# Patient Record
Sex: MALE | HISPANIC OR LATINO | URBAN - METROPOLITAN AREA
[De-identification: names, ages, dates, MRNs, and addresses within clinical notes are randomized per-mention and may not be internally consistent; named-entity substitution may affect disease eponyms.]

---
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
## 2023-01-03 ENCOUNTER — OFFICE VISIT (OUTPATIENT)
Dept: OPHTHALMOLOGY | Facility: CLINIC | Age: 18
End: 2023-01-03
Attending: OPHTHALMOLOGY
Payer: COMMERCIAL

## 2023-01-03 DIAGNOSIS — H16.253 PHLYCTENULAR KERATOCONJUNCTIVITIS OF BOTH EYES: ICD-10-CM

## 2023-01-03 DIAGNOSIS — H16.253 PHLYCTENULAR KERATOCONJUNCTIVITIS OF BOTH EYES: Primary | ICD-10-CM

## 2023-01-03 DIAGNOSIS — H16.403 CORNEAL NEOVASCULARIZATION OF BOTH EYES: ICD-10-CM

## 2023-01-03 DIAGNOSIS — H16.9 KERATITIS, BILATERAL: ICD-10-CM

## 2023-01-03 PROCEDURE — 99214 OFFICE O/P EST MOD 30 MIN: CPT | Mod: GC | Performed by: OPHTHALMOLOGY

## 2023-01-03 PROCEDURE — G0463 HOSPITAL OUTPT CLINIC VISIT: HCPCS

## 2023-01-03 PROCEDURE — 92285 EXTERNAL OCULAR PHOTOGRAPHY: CPT | Performed by: OPHTHALMOLOGY

## 2023-01-03 ASSESSMENT — VISUAL ACUITY
OD_SC+: +2
OD_SC: 20/25
CORRECTION_TYPE: GLASSES
OS_SC: 20/20
METHOD: SNELLEN - LINEAR

## 2023-01-03 ASSESSMENT — CONF VISUAL FIELD
OD_SUPERIOR_TEMPORAL_RESTRICTION: 0
OS_NORMAL: 1
OD_INFERIOR_NASAL_RESTRICTION: 0
METHOD: COUNTING FINGERS
OS_INFERIOR_TEMPORAL_RESTRICTION: 0
OS_SUPERIOR_NASAL_RESTRICTION: 0
OD_INFERIOR_TEMPORAL_RESTRICTION: 0
OS_SUPERIOR_TEMPORAL_RESTRICTION: 0
OD_SUPERIOR_NASAL_RESTRICTION: 0
OS_INFERIOR_NASAL_RESTRICTION: 0
OD_NORMAL: 1

## 2023-01-03 ASSESSMENT — TONOMETRY
OD_IOP_MMHG: 17`
OS_IOP_MMHG: 16
IOP_METHOD: ICARE

## 2023-01-03 ASSESSMENT — REFRACTION_WEARINGRX
OS_SPHERE: -4.00
OS_CYLINDER: +3.25
OS_AXIS: 094
OD_SPHERE: -4.50
OD_CYLINDER: +2.50
SPECS_TYPE: SVL
OD_AXIS: 097

## 2023-01-03 ASSESSMENT — EXTERNAL EXAM - RIGHT EYE: OD_EXAM: NORMAL

## 2023-01-03 ASSESSMENT — EXTERNAL EXAM - LEFT EYE: OS_EXAM: NORMAL

## 2023-01-03 ASSESSMENT — SLIT LAMP EXAM - LIDS
COMMENTS: NORMAL
COMMENTS: NORMAL

## 2023-01-03 NOTE — NURSING NOTE
Chief Complaints and History of Present Illnesses   Patient presents with     Follow Up     Phlyctenular keratoconjunctivitis of both eyes      Chief Complaint(s) and History of Present Illness(es)     Follow Up            Comments: Phlyctenular keratoconjunctivitis of both eyes           Comments    Pt states no change in VA since last visit  Pt states no itching, tearing ,dryness, ir eye pain    Mary Manzano COT 8:02 AM January 3, 2023

## 2023-01-18 ENCOUNTER — TELEPHONE (OUTPATIENT)
Dept: OPHTHALMOLOGY | Facility: CLINIC | Age: 18
End: 2023-01-18
Payer: COMMERCIAL

## 2023-01-19 ENCOUNTER — TELEPHONE (OUTPATIENT)
Dept: OPHTHALMOLOGY | Facility: CLINIC | Age: 18
End: 2023-01-19
Payer: COMMERCIAL

## 2023-01-19 NOTE — TELEPHONE ENCOUNTER
Health Call Center    Phone Message    May a detailed message be left on voicemail: yes     Reason for Call: Other: Patient calling in for r/s four week follow up appointment. Dr. Holder wanted him seen around 1/31/23 but next available was 3/21/23. Please call patient if appointment needs to be r/s to a sooner date, thank you.     Action Taken: Message routed to:  [facility] Eye    Travel Screening: Not Applicable

## 2023-01-20 NOTE — TELEPHONE ENCOUNTER
Riccardo Contreras x 2 M    Rescheduled his appointment with Dr. Holder for 1/31 @ 2 pm - ok per Serenity Delgado Communication Facilitator on 1/20/2023 at 9:55 AM

## 2023-01-31 ENCOUNTER — OFFICE VISIT (OUTPATIENT)
Dept: OPHTHALMOLOGY | Facility: CLINIC | Age: 18
End: 2023-01-31
Attending: STUDENT IN AN ORGANIZED HEALTH CARE EDUCATION/TRAINING PROGRAM
Payer: COMMERCIAL

## 2023-01-31 DIAGNOSIS — H16.9 KERATITIS, BILATERAL: ICD-10-CM

## 2023-01-31 DIAGNOSIS — H40.043 STEROID RESPONDER, BILATERAL: ICD-10-CM

## 2023-01-31 DIAGNOSIS — H16.403 CORNEAL NEOVASCULARIZATION OF BOTH EYES: ICD-10-CM

## 2023-01-31 DIAGNOSIS — H16.253 PHLYCTENULAR KERATOCONJUNCTIVITIS OF BOTH EYES: Primary | ICD-10-CM

## 2023-01-31 PROCEDURE — G0463 HOSPITAL OUTPT CLINIC VISIT: HCPCS

## 2023-01-31 PROCEDURE — 99214 OFFICE O/P EST MOD 30 MIN: CPT | Mod: GC | Performed by: OPHTHALMOLOGY

## 2023-01-31 RX ORDER — CYCLOSPORINE 0.5 MG/ML
1 EMULSION OPHTHALMIC 2 TIMES DAILY
COMMUNITY
End: 2023-01-31

## 2023-01-31 RX ORDER — CYCLOSPORINE 0.5 MG/ML
1 EMULSION OPHTHALMIC 2 TIMES DAILY
Qty: 120 EACH | Refills: 4 | Status: SHIPPED | OUTPATIENT
Start: 2023-01-31 | End: 2024-01-04

## 2023-01-31 ASSESSMENT — REFRACTION_WEARINGRX
OD_SPHERE: -4.50
OS_AXIS: 094
OS_SPHERE: -4.00
SPECS_TYPE: SVL
OD_AXIS: 097
OD_CYLINDER: +2.50
OS_CYLINDER: +3.25

## 2023-01-31 ASSESSMENT — TONOMETRY
OS_IOP_MMHG: 21
IOP_METHOD: ICARE
OD_IOP_MMHG: 14

## 2023-01-31 ASSESSMENT — VISUAL ACUITY
METHOD: SNELLEN - LINEAR
OS_CC: 20/20
OD_CC: 20/25
CORRECTION_TYPE: GLASSES
OD_CC+: +2

## 2023-01-31 ASSESSMENT — CONF VISUAL FIELD
OD_INFERIOR_TEMPORAL_RESTRICTION: 0
OD_SUPERIOR_TEMPORAL_RESTRICTION: 0
OS_INFERIOR_TEMPORAL_RESTRICTION: 0
OD_SUPERIOR_NASAL_RESTRICTION: 0
OD_NORMAL: 1
OS_NORMAL: 1
OD_INFERIOR_NASAL_RESTRICTION: 0
OS_SUPERIOR_NASAL_RESTRICTION: 0
METHOD: COUNTING FINGERS
OS_INFERIOR_NASAL_RESTRICTION: 0
OS_SUPERIOR_TEMPORAL_RESTRICTION: 0

## 2023-01-31 ASSESSMENT — EXTERNAL EXAM - LEFT EYE: OS_EXAM: NORMAL

## 2023-01-31 ASSESSMENT — SLIT LAMP EXAM - LIDS
COMMENTS: NORMAL
COMMENTS: NORMAL

## 2023-01-31 ASSESSMENT — EXTERNAL EXAM - RIGHT EYE: OD_EXAM: NORMAL

## 2023-01-31 NOTE — PROGRESS NOTES
CC: Follow up for phlycentular DS each eye.    Initial HPI:  Ben Delgado is a 17 year old male with inflammation in both eyes that started in August 2021. He was seen multiple times at [facility] [place]. Condition improved with steroid  (and Doxy) TX. Much improved sx and VA with TX. Last see at [facility] May 2022.    Interval History 01/31/23: Feels very comfortable without eye pain. No flashes, floaters or curtains. Very happy with vision in both eyes.    LCV 9/2022. Symptoms at last visit had improved with prednisolone daily and there was the consideration to decrease to every other day. Patient was also on restasis - there was the recommendation that he could increase restasis as he decreases prednisolone. He was also on cosopt for IOP elevation with prednisolone and doxycycline PO. Today he states he is using prednisolone every other day in both eyes, cosopt BID OU, restasis BID OU, AT daily OU - states his doxycycline was discontinued.     Meds:   Pred every other day each eye  Cyclosporine each eye BID  AT each eye BID (using occasionally)  Lid Hygein each eye bid (baby shampoo)    POH: each eye chalazia in past.    Fam HX: cataracts.    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  - 9/2022: Pt on appropriate TX with much improved VA and Sx by HX.  Va good today. IOP at upper end of Normal. (pt on pred q daily). Continued prednisolone daily, cosopt BID, restasis BID  - 01/03/23 Much improved phlyctenular disease.   - 1/31/23: stable appearing areas of neovascularization, does not appear actively inflamed  - Finishprednisolone every other day   - Continue lid hygiene   - Continue artificial tears PRN OU  - restasis each eye BID  - Discussed chronic nature of this disease that can last a decade ormore.   - Discussed cornea scar and NV.    RTC: 6 - 12 months VT, sooner PRN    Phil Mclain MD  Fellow, Cornea, External Disease and Refractive Surgery  Department of  Ophthalmology  [facility]      Attending Physician Attestation:  Complete documentation of historical and exam elements from today's encounter can be found in the full encounter summary report (not reduplicated in this progress note).  I personally obtained the chief complaint(s) and history of present illness.  I confirmed and edited as necessary the review of systems, past medical/surgical history, family history, social history, and examination findings as documented by others; and I examined the patient myself.  I personally reviewed the relevant tests, images, and reports as documented above.  I formulated and edited as necessary the assessment and plan and discussed the findings and management plan with the patient and family. - Toan Holder MD

## 2023-01-31 NOTE — NURSING NOTE
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          Comments    Pt states no changes to VA since last visit. No pain, redness or tearing . No Flashes or Floaters.    Ocular Meds:  Restasis BID in each eye  Prednisolone every other day in each eye   ATs PRN    Garry Ho 2:04 PM January 31, 2023     Reviewed chart Evelin Pires COT 2:21 PM January 31, 2023

## 2023-02-07 ENCOUNTER — TELEPHONE (OUTPATIENT)
Dept: OPHTHALMOLOGY | Facility: CLINIC | Age: 18
End: 2023-02-07
Payer: COMMERCIAL

## 2023-02-07 NOTE — TELEPHONE ENCOUNTER
Patient communicator left message today reviewing Dr. Holder recommended f/u in 6-12 months             [facility] Call Center    Phone Message    May a detailed message be left on voicemail: yes     Reason for Call: Other: Pt calling in he would like to know when Dr Holder would like him to have a routine eye exam, please call back to discuss further      Action Taken: Message routed to:  Other: eye     Travel Screening: Not Applicable

## 2023-06-16 PROBLEM — H52.13 MYOPIA OF BOTH EYES WITH ASTIGMATISM: Status: ACTIVE | Noted: 2019-04-15

## 2023-06-16 PROBLEM — R51.9 FREQUENT HEADACHES: Status: ACTIVE | Noted: 2020-10-30

## 2023-06-16 PROBLEM — H52.203 MYOPIA OF BOTH EYES WITH ASTIGMATISM: Status: ACTIVE | Noted: 2019-04-15

## 2023-06-16 PROBLEM — L70.0 ACNE VULGARIS: Status: ACTIVE | Noted: 2018-03-27

## 2023-06-16 PROBLEM — H00.14 CHALAZION OF LEFT UPPER EYELID: Status: ACTIVE | Noted: 2019-04-15

## 2023-06-16 NOTE — PROGRESS NOTES
HPI:  Patient presents for an annual eye exam. Doing well.       Pertinent Medical History:    Headaches    Ocular History:    YIN Bearden 04/15/2019.    Myopia, both eyes.     Steroid responder, both eyes.     Eye Medications:    Discontinued pred forte both eyes.     Discontinued cosopt both eyes.     Restasis 2 times daily both eyes.     Assessment and Plan:    #   Myopia, both eyes.     Glasses prescription given.     #   History of Phlyctenular Keratitis (secondary to staph). Inactive.     Restasis 2 times daily both eyes.     Preservative free artificial tears 4 times daily both eyes. As needed.     Last seen by Dr. Holder on 01/31/2023.     #   Central Corneal Scar, right eye.     Due to phlyctenular keratitis in the past.     Vision is 20/20 in both eyes today 06/27/2023.    Central corneal scar is likely mildly visually significant despite 20/20 vision.     #   History of Steroid Responder, both eyes.     Tmax: 34/35    IOP today: 18/18    No longer on steroids. No longer on cosopt.       Patient consented to a dilated eye exam:    Yes. Side effects discussed.    Medical History:  No past medical history on file.    Medications:  Current Outpatient Medications   Medication Sig Dispense Refill     Artificial Tear Solution (JUST TEARS EYE DROPS) SOLN 1 drop       cycloSPORINE (RESTASIS) 0.05 % ophthalmic emulsion Place 1 drop into both eyes 2 times daily 120 each 4     dorzolamide-timolol (COSOPT) 2-0.5 % ophthalmic solution Place 1 drop into both eyes 2 times daily 10 mL 1     prednisoLONE acetate (PRED MILD) 0.12 % ophthalmic suspension 1-2 drops every 48 hours     Complete documentation of historical and exam elements from today's encounter can be found in the full encounter summary report (not reduplicated in this progress note). I personally obtained the chief complaint(s) and history of present illness.  I confirmed and edited as necessary the review of systems, past medical/surgical history, family  history, social history, and examination findings as documented by others; and I examined the patient myself. I personally reviewed the relevant tests, images, and reports as documented above. I formulated and edited as necessary the assessment and plan and discussed the findings and management plan with the patient and family. - Aldair Luis OD

## 2023-06-27 ENCOUNTER — OFFICE VISIT (OUTPATIENT)
Dept: OPHTHALMOLOGY | Facility: CLINIC | Age: 18
End: 2023-06-27
Attending: OPHTHALMOLOGY
Payer: COMMERCIAL

## 2023-06-27 DIAGNOSIS — H17.9 RIGHT CORNEAL SCAR: ICD-10-CM

## 2023-06-27 DIAGNOSIS — H52.13 MYOPIA OF BOTH EYES: Primary | ICD-10-CM

## 2023-06-27 PROCEDURE — G0463 HOSPITAL OUTPT CLINIC VISIT: HCPCS | Performed by: OPTOMETRIST

## 2023-06-27 PROCEDURE — 92015 DETERMINE REFRACTIVE STATE: CPT

## 2023-06-27 PROCEDURE — 92004 COMPRE OPH EXAM NEW PT 1/>: CPT | Performed by: OPTOMETRIST

## 2023-06-27 ASSESSMENT — EXTERNAL EXAM - RIGHT EYE: OD_EXAM: NORMAL

## 2023-06-27 ASSESSMENT — EXTERNAL EXAM - LEFT EYE: OS_EXAM: NORMAL

## 2023-06-27 ASSESSMENT — REFRACTION_MANIFEST
OS_SPHERE: -4.50
OD_CYLINDER: +2.25
OD_AXIS: 090
OS_CYLINDER: +3.00
OD_SPHERE: -4.25
OS_AXIS: 095

## 2023-06-27 ASSESSMENT — VISUAL ACUITY
OS_CC: 20/20
OS_CC+: -1
OD_CC: 20/20
METHOD: SNELLEN - LINEAR
CORRECTION_TYPE: GLASSES

## 2023-06-27 ASSESSMENT — CONF VISUAL FIELD
OS_SUPERIOR_NASAL_RESTRICTION: 0
OS_INFERIOR_TEMPORAL_RESTRICTION: 0
OD_SUPERIOR_NASAL_RESTRICTION: 0
OD_INFERIOR_NASAL_RESTRICTION: 0
OS_NORMAL: 1
OD_NORMAL: 1
METHOD: COUNTING FINGERS
OS_SUPERIOR_TEMPORAL_RESTRICTION: 0
OS_INFERIOR_NASAL_RESTRICTION: 0
OD_SUPERIOR_TEMPORAL_RESTRICTION: 0
OD_INFERIOR_TEMPORAL_RESTRICTION: 0

## 2023-06-27 ASSESSMENT — REFRACTION_WEARINGRX
OS_SPHERE: -4.00
OD_SPHERE: -4.50
OD_AXIS: 097
OS_AXIS: 094
OS_CYLINDER: +3.25
OD_CYLINDER: +2.50
SPECS_TYPE: SVL

## 2023-06-27 ASSESSMENT — TONOMETRY
IOP_METHOD: TONOPEN
OS_IOP_MMHG: 18
OD_IOP_MMHG: 18

## 2023-06-27 ASSESSMENT — SLIT LAMP EXAM - LIDS
COMMENTS: NORMAL
COMMENTS: NORMAL

## 2023-06-27 NOTE — NURSING NOTE
Chief Complaints and History of Present Illnesses   Patient presents with     COMPREHENSIVE EYE EXAM     Chief Complaint(s) and History of Present Illness(es)     COMPREHENSIVE EYE EXAM           Comments    Patient states that his vision is well in both eyes. No dryness. No pain and irritation. No flashes of light. No floaters.     Ocular Meds:restasis BID each eye   AT daily OU     Parag Morajohan VÁSQUEZ, June 27, 2023 1:25 PM

## 2024-01-04 ENCOUNTER — OFFICE VISIT (OUTPATIENT)
Dept: OPHTHALMOLOGY | Facility: CLINIC | Age: 19
End: 2024-01-04
Attending: OPHTHALMOLOGY
Payer: COMMERCIAL

## 2024-01-04 DIAGNOSIS — H40.043 STEROID RESPONDER, BILATERAL: ICD-10-CM

## 2024-01-04 DIAGNOSIS — H16.403 CORNEAL NEOVASCULARIZATION OF BOTH EYES: ICD-10-CM

## 2024-01-04 DIAGNOSIS — H16.9 KERATITIS, BILATERAL: ICD-10-CM

## 2024-01-04 DIAGNOSIS — H16.253 PHLYCTENULAR KERATOCONJUNCTIVITIS OF BOTH EYES: ICD-10-CM

## 2024-01-04 PROCEDURE — 99214 OFFICE O/P EST MOD 30 MIN: CPT | Performed by: OPHTHALMOLOGY

## 2024-01-04 PROCEDURE — G0463 HOSPITAL OUTPT CLINIC VISIT: HCPCS | Performed by: OPHTHALMOLOGY

## 2024-01-04 RX ORDER — CYCLOSPORINE 0.5 MG/ML
1 EMULSION OPHTHALMIC 2 TIMES DAILY
Qty: 120 EACH | Refills: 4 | Status: SHIPPED | OUTPATIENT
Start: 2024-01-04

## 2024-01-04 ASSESSMENT — CONF VISUAL FIELD
OD_INFERIOR_NASAL_RESTRICTION: 0
METHOD: COUNTING FINGERS
OS_NORMAL: 1
OD_SUPERIOR_TEMPORAL_RESTRICTION: 0
OD_SUPERIOR_NASAL_RESTRICTION: 0
OS_SUPERIOR_NASAL_RESTRICTION: 0
OS_INFERIOR_NASAL_RESTRICTION: 0
OS_INFERIOR_TEMPORAL_RESTRICTION: 0
OS_SUPERIOR_TEMPORAL_RESTRICTION: 0
OD_NORMAL: 1
OD_INFERIOR_TEMPORAL_RESTRICTION: 0

## 2024-01-04 ASSESSMENT — REFRACTION_WEARINGRX
OD_CYLINDER: +2.25
SPECS_TYPE: SVL
OS_SPHERE: -4.50
OS_AXIS: 095
OS_CYLINDER: +3.00
OD_SPHERE: -4.25
OD_AXIS: 090

## 2024-01-04 ASSESSMENT — VISUAL ACUITY
CORRECTION_TYPE: GLASSES
METHOD: SNELLEN - LINEAR
OS_CC: 20/20
OD_CC: 20/20

## 2024-01-04 ASSESSMENT — EXTERNAL EXAM - LEFT EYE: OS_EXAM: NORMAL

## 2024-01-04 ASSESSMENT — TONOMETRY
IOP_METHOD: ICARE
OS_IOP_MMHG: 19
OD_IOP_MMHG: 17

## 2024-01-04 ASSESSMENT — SLIT LAMP EXAM - LIDS
COMMENTS: NORMAL
COMMENTS: NORMAL

## 2024-01-04 ASSESSMENT — EXTERNAL EXAM - RIGHT EYE: OD_EXAM: NORMAL

## 2024-01-04 NOTE — NURSING NOTE
Chief Complaints and History of Present Illnesses   Patient presents with    Corneal Evaluation     Chief Complaint(s) and History of Present Illness(es)       Corneal Evaluation              Laterality: both eyes    Onset: years ago    Quality: States va is the same since last visit      Associated symptoms: Negative for dryness, redness, tearing, photophobia, flashes and floaters    Treatments tried: eye drops    Pain scale: 0/10              Comments    Here for Phlyctenular keratoconjunctivitis of both eyes   Restasis each eye BID  Warm compress discontinue awhile ago  Evelin Pires COT 9:19 AM January 4, 2024

## 2024-01-04 NOTE — PROGRESS NOTES
CC: Follow up for phlycentular DS each eye.    Initial HPI:  Ben Delgado is a 17 year old male with inflammation in both eyes that started in August 2021. He was seen multiple times at [facility] ophthalmology clinic. Condition improved with steroid  (and Doxy) TX. Much improved sx and VA with TX. Last see at [facility] May 2022.    Interval History 01/31/23: Feels very comfortable without eye pain. No flashes, floaters or curtains. Very happy with vision in both eyes.  1/4/24: Pt states no problkems and condition is the same. OU    LCV 9/2022. Symptoms at last visit had improved with prednisolone daily and there was the consideration to decrease to every other day. Patient was also on restasis - there was the recommendation that he could increase restasis as he decreases prednisolone. He was also on cosopt for IOP elevation with prednisolone and doxycycline PO. Today he states he is using prednisolone every other day in both eyes, cosopt BID OU, restasis BID OU, AT daily OU - states his doxycycline was discontinued.     Meds:   Pred every other day each eye  Cyclosporine each eye BID  AT each eye BID (using occasionally)  Lid Hygein each eye bid (baby shampoo)    POH: each eye chalazia in past.    Fam HX: cataracts.    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  - 9/2022: Pt on appropriate TX with much improved VA and Sx by HX.  Va good today. IOP at upper end of Normal. (pt on pred q daily). Continued prednisolone daily, cosopt BID, restasis BID  - 01/03/23 Much improved phlyctenular disease.   - 1/31/23 and 1/4/24: stable appearing areas of neovascularization, does not appear actively inflamed  - Finishprednisolone every other day   - Continue lid hygiene   - Continue artificial tears PRN OU  - restasis each eye BID  - Discussed chronic nature of this disease that can last a decade ormore.   - Discussed cornea scar and NV.    RTC: 6 - 12 months with Dr Lara [place], sooner PRN      Attending  Physician Attestation:  Complete documentation of historical and exam elements from today's encounter can be found in the full encounter summary report (not reduplicated in this progress note).  I personally obtained the chief complaint(s) and history of present illness.  I confirmed and edited as necessary the review of systems, past medical/surgical history, family history, social history, and examination findings as documented by others; and I examined the patient myself.  I personally reviewed the relevant tests, images, and reports as documented above.  I formulated and edited as necessary the assessment and plan and discussed the findings and management plan with the patient and family. - Toan Holder MD

## 2024-06-12 DIAGNOSIS — Z79.899 ENCOUNTER FOR EYE EXAM DUE TO HIGH RISK MEDICATION: Primary | ICD-10-CM

## 2024-08-07 NOTE — PROGRESS NOTES
HPI:  Patient presents for monitoring of high risk medication - tretinoin. Vision is stable.     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 Majoring in chemical engineering.       Pertinent Medical History:  Headaches  Inflammatory acne - on tretinoin     Ocular History:  YIN Bearden 04/15/2019.  Myopia, both eyes.   Steroid responder, both eyes.   Phlyctenular keratitis, both eyes.   Corneal scars, both eyes.     Eye Medications:  Discontinued pred forte both eyes.   Discontinued cosopt both eyes.   Restasis 2 times daily both eyes.   Artificial tears both eyes.     Assessment and Plan:    #   High Risk Medication - Isotretinoin  Visual field 09/03/2024: Both eyes: generalized defects (due to test taking challenges vs dry eyes.   Optic nerve OCT 09/03/2024: Both eyes: normal  Macular OCT 09/03/2024  Started tretinoin ~02/2024.   Ocular side effects include keratitis, corneal opacities, inflammation of eyelid margins, crusting of eyelids, papillary conjunctivitis, keratitis sicca, blepharitis, papilledema, optic neuritis, CRVO, retinal or choroidal pigment disturbances, diallo and cone photoreceptor damage, thinning of macula, premacular hemorrhages, cataracts, and lacrimal gland atrophy.   No ocular sequelae.   Repeat visual field with artificial tears in 1 month.     #   High Myopia, both eyes.   Educated on signs and symptoms of a retinal detachment (ie. Hundreds of floaters, flashes of light, and shadow/curtain over the vision) to be seen immediately.   Recommend annual dilated eye exam.  Glasses prescription given.      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 Inactive.   Restasis 2 times daily both eyes.   Preservative free artificial tears 4 times daily both eyes. As needed.   Last seen by Dr. Holder on 01/31/2023.     #   Central Corneal Scar, right eye.   #   Peripheral Scar, left eye.   Due to phlyctenular keratitis in the past.   Vision is 20/20 in both eyes  today 06/27/2023.  Central corneal scar is likely mildly visually significant despite 20/20 vision.     #   History of Steroid Responder, both eyes.   Tmax: 34/35  IOP today: 17/19  No longer on steroids. No longer on cosopt.           Patient consented to a dilated eye exam:  Yes. Side effects discussed.  Mood/affect: nice.     Medical History:  No past medical history on file.    Medications:  Current Outpatient Medications   Medication Sig Dispense Refill    Artificial Tear Solution (JUST TEARS EYE DROPS) SOLN 1 drop      cycloSPORINE (RESTASIS) 0.05 % ophthalmic emulsion Place 1 drop into both eyes 2 times daily 120 each 4    dorzolamide-timolol (COSOPT) 2-0.5 % ophthalmic solution Place 1 drop into both eyes 2 times daily 10 mL 1    prednisoLONE acetate (PRED MILD) 0.12 % ophthalmic suspension 1-2 drops every 48 hours     Complete documentation of historical and exam elements from today's encounter can be found in the full encounter summary report (not reduplicated in this progress note). I personally obtained the chief complaint(s) and history of present illness.  I confirmed and edited as necessary the review of systems, past medical/surgical history, family history, social history, and examination findings as documented by others; and I examined the patient myself. I personally reviewed the relevant tests, images, and reports as documented above. I formulated and edited as necessary the assessment and plan and discussed the findings and management plan with the patient and family. - Aldair uLis OD

## 2024-08-31 ENCOUNTER — HEALTH MAINTENANCE LETTER (OUTPATIENT)
Age: 19
End: 2024-08-31

## 2024-09-03 ENCOUNTER — OFFICE VISIT (OUTPATIENT)
Dept: OPHTHALMOLOGY | Facility: CLINIC | Age: 19
End: 2024-09-03
Attending: OPTOMETRIST
Payer: COMMERCIAL

## 2024-09-03 DIAGNOSIS — H16.253 PHLYCTENULAR KERATOCONJUNCTIVITIS OF BOTH EYES: Primary | ICD-10-CM

## 2024-09-03 DIAGNOSIS — H40.043 STEROID RESPONDER, BILATERAL: ICD-10-CM

## 2024-09-03 DIAGNOSIS — Z79.899 ENCOUNTER FOR EYE EXAM DUE TO HIGH RISK MEDICATION: Primary | ICD-10-CM

## 2024-09-03 DIAGNOSIS — Z79.899 ENCOUNTER FOR EYE EXAM DUE TO HIGH RISK MEDICATION: ICD-10-CM

## 2024-09-03 DIAGNOSIS — H17.9 RIGHT CORNEAL SCAR: ICD-10-CM

## 2024-09-03 PROCEDURE — 92134 CPTRZ OPH DX IMG PST SGM RTA: CPT | Performed by: OPTOMETRIST

## 2024-09-03 PROCEDURE — 92015 DETERMINE REFRACTIVE STATE: CPT

## 2024-09-03 PROCEDURE — 92133 CPTRZD OPH DX IMG PST SGM ON: CPT | Performed by: OPTOMETRIST

## 2024-09-03 PROCEDURE — 92014 COMPRE OPH EXAM EST PT 1/>: CPT | Performed by: OPTOMETRIST

## 2024-09-03 PROCEDURE — G0463 HOSPITAL OUTPT CLINIC VISIT: HCPCS | Performed by: OPTOMETRIST

## 2024-09-03 PROCEDURE — 99207 OCT RETINA SPECTRALIS OU (BOTH EYE): CPT | Mod: 26 | Performed by: OPTOMETRIST

## 2024-09-03 PROCEDURE — 92083 EXTENDED VISUAL FIELD XM: CPT | Performed by: OPTOMETRIST

## 2024-09-03 RX ORDER — TRETINOIN 0.5 MG/G
CREAM TOPICAL
COMMUNITY
Start: 2023-10-19

## 2024-09-03 RX ORDER — AZELAIC ACID 0.15 G/G
GEL TOPICAL
COMMUNITY
Start: 2024-05-01

## 2024-09-03 RX ORDER — DOXYCYCLINE HYCLATE 100 MG
100 TABLET ORAL
COMMUNITY
Start: 2023-10-19

## 2024-09-03 ASSESSMENT — VISUAL ACUITY
METHOD: SNELLEN - LINEAR
OS_CC+: -2
OD_CC: 20/20
OS_CC: 20/20
CORRECTION_TYPE: GLASSES

## 2024-09-03 ASSESSMENT — CONF VISUAL FIELD
OS_SUPERIOR_TEMPORAL_RESTRICTION: 0
OD_INFERIOR_NASAL_RESTRICTION: 0
OD_SUPERIOR_NASAL_RESTRICTION: 0
OD_INFERIOR_TEMPORAL_RESTRICTION: 0
OS_SUPERIOR_NASAL_RESTRICTION: 0
OS_INFERIOR_NASAL_RESTRICTION: 0
OD_NORMAL: 1
OS_INFERIOR_TEMPORAL_RESTRICTION: 0
METHOD: COUNTING FINGERS
OD_SUPERIOR_TEMPORAL_RESTRICTION: 0
OS_NORMAL: 1

## 2024-09-03 ASSESSMENT — REFRACTION_WEARINGRX
SPECS_TYPE: SVL
OD_SPHERE: -4.25
OS_SPHERE: -4.50
OS_CYLINDER: +3.00
OS_AXIS: 096
OD_AXIS: 092
OD_CYLINDER: +2.25

## 2024-09-03 ASSESSMENT — EXTERNAL EXAM - RIGHT EYE: OD_EXAM: NORMAL

## 2024-09-03 ASSESSMENT — TONOMETRY
OS_IOP_MMHG: 19
IOP_METHOD: TONOPEN
OD_IOP_MMHG: 17

## 2024-09-03 ASSESSMENT — REFRACTION_MANIFEST
OD_AXIS: 090
OS_CYLINDER: +3.75
OD_SPHERE: -5.75
OS_AXIS: 089
OD_CYLINDER: +3.50
OS_SPHERE: -5.25

## 2024-09-03 ASSESSMENT — EXTERNAL EXAM - LEFT EYE: OS_EXAM: NORMAL

## 2024-09-03 ASSESSMENT — SLIT LAMP EXAM - LIDS
COMMENTS: NORMAL
COMMENTS: NORMAL

## 2024-09-03 NOTE — NURSING NOTE
Chief Complaints and History of Present Illnesses   Patient presents with    Follow Up     1 year follow up Tretinoin use     Chief Complaint(s) and History of Present Illness(es)       Follow Up              Comments: 1 year follow up Tretinoin use              Comments    Pt using Tretinoin for the past 6-7 months.  Pt states vision is the same as last visit, no changes. No new flashes or floaters. No redness or dryness.  No DM.    GARCIA Anne September 3, 2024 7:48 AM

## 2024-09-03 NOTE — PROGRESS NOTES
HPI:  Patient presents for monitoring of high risk medication - tretinoin.     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       Pertinent Medical History:  Headaches  Inflammatory acne - on tretinoin     Ocular History:  YIN Bearden 04/15/2019.  Myopia, both eyes.   Steroid responder, both eyes.   Phlyctenular keratitis, both eyes.   Corneal scars, both eyes.   High risk medication - tretinoin.     Eye Medications:  Discontinued pred forte both eyes.   Discontinued cosopt both eyes.   Restasis 2 times daily both eyes.   Artificial tears both eyes.     Assessment and Plan:    #   High Risk Medication - Isotretinoin  Visual field 09/03/2024: Both eyes: generalized defects (due to test taking challenges vs dry eyes.   Optic nerve OCT 09/03/2024: Both eyes: normal  Macular OCT 09/03/2024: Both eyes: normal   Started tretinoin ~02/2024.   Ocular side effects include keratitis, corneal opacities, inflammation of eyelid margins, crusting of eyelids, papillary conjunctivitis, keratitis sicca, blepharitis, papilledema, optic neuritis, CRVO, retinal or choroidal pigment disturbances, diallo and cone photoreceptor damage, thinning of macula, premacular hemorrhages, cataracts, and lacrimal gland atrophy.   No ocular sequelae.   Recommend optic nerve/mac oct and dilation in 1 year.     #   High Myopia, both eyes.   Educated on signs and symptoms of a retinal detachment (ie. Hundreds of floaters, flashes of light, and shadow/curtain over the vision) to be seen immediately.   Recommend annual dilated eye exam.  Glasses prescription given.      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 Inactive.   Restasis 2 times daily both eyes.   Preservative free artificial tears 4 times daily both eyes. As needed.   Last seen by Dr. Holder on 01/31/2023.     #   Central Corneal Scar, right eye.   #   Peripheral Scar, left eye.   Due to phlyctenular keratitis in  the past.   Vision is 20/20 in both eyes today 06/27/2023.  Central corneal scar is likely mildly visually significant despite 20/20 vision.     #   History of Steroid Responder, both eyes.   Tmax: 34/35  IOP today: 09/12  No longer on steroids. No longer on cosopt.           Patient consented to a dilated eye exam:  Yes. Side effects discussed.  Mood/affect: nice.     Medical History:  No past medical history on file.    Medications:  Current Outpatient Medications   Medication Sig Dispense Refill    Artificial Tear Solution (JUST TEARS EYE DROPS) SOLN 1 drop      azelaic acid (FINACIA) 15 % external gel       cycloSPORINE (RESTASIS) 0.05 % ophthalmic emulsion Place 1 drop into both eyes 2 times daily 120 each 4    doxycycline hyclate (VIBRA-TABS) 100 MG tablet Take 100 mg by mouth.      tretinoin (RETIN-A) 0.05 % external cream Apply topically.     Complete documentation of historical and exam elements from today's encounter can be found in the full encounter summary report (not reduplicated in this progress note). I personally obtained the chief complaint(s) and history of present illness.  I confirmed and edited as necessary the review of systems, past medical/surgical history, family history, social history, and examination findings as documented by others; and I examined the patient myself. I personally reviewed the relevant tests, images, and reports as documented above. I formulated and edited as necessary the assessment and plan and discussed the findings and management plan with the patient and family. - Aldair Luis OD

## 2024-10-01 ENCOUNTER — OFFICE VISIT (OUTPATIENT)
Dept: OPHTHALMOLOGY | Facility: CLINIC | Age: 19
End: 2024-10-01
Attending: OPTOMETRIST
Payer: COMMERCIAL

## 2024-10-01 DIAGNOSIS — Z79.899 ENCOUNTER FOR EYE EXAM DUE TO HIGH RISK MEDICATION: ICD-10-CM

## 2024-10-01 DIAGNOSIS — H17.9 RIGHT CORNEAL SCAR: Primary | ICD-10-CM

## 2024-10-01 PROCEDURE — 92083 EXTENDED VISUAL FIELD XM: CPT | Performed by: OPTOMETRIST

## 2024-10-01 PROCEDURE — G0463 HOSPITAL OUTPT CLINIC VISIT: HCPCS | Performed by: OPTOMETRIST

## 2024-10-01 PROCEDURE — 92012 INTRM OPH EXAM EST PATIENT: CPT | Performed by: OPTOMETRIST

## 2024-10-01 ASSESSMENT — TONOMETRY
OS_IOP_MMHG: 12
IOP_METHOD: TONOPEN
OD_IOP_MMHG: 9

## 2024-10-01 ASSESSMENT — CONF VISUAL FIELD
OD_INFERIOR_TEMPORAL_RESTRICTION: 0
OD_INFERIOR_NASAL_RESTRICTION: 0
OS_INFERIOR_NASAL_RESTRICTION: 0
OS_SUPERIOR_NASAL_RESTRICTION: 0
OD_NORMAL: 1
METHOD: COUNTING FINGERS
OD_SUPERIOR_TEMPORAL_RESTRICTION: 0
OS_NORMAL: 1
OS_INFERIOR_TEMPORAL_RESTRICTION: 0
OS_SUPERIOR_TEMPORAL_RESTRICTION: 0
OD_SUPERIOR_NASAL_RESTRICTION: 0

## 2024-10-01 ASSESSMENT — REFRACTION_WEARINGRX
OS_CYLINDER: +3.00
OD_AXIS: 092
SPECS_TYPE: SVL
OD_SPHERE: -4.25
OD_CYLINDER: +2.25
OS_SPHERE: -4.50
OS_AXIS: 096

## 2024-10-01 ASSESSMENT — SLIT LAMP EXAM - LIDS
COMMENTS: NORMAL
COMMENTS: NORMAL

## 2024-10-01 ASSESSMENT — EXTERNAL EXAM - RIGHT EYE: OD_EXAM: NORMAL

## 2024-10-01 ASSESSMENT — VISUAL ACUITY
METHOD: SNELLEN - LINEAR
OS_CC: 20/20
CORRECTION_TYPE: GLASSES
OS_CC+: -1
OD_CC: 20/20

## 2024-10-01 ASSESSMENT — EXTERNAL EXAM - LEFT EYE: OS_EXAM: NORMAL

## 2024-10-01 NOTE — NURSING NOTE
Chief Complaints and History of Present Illnesses   Patient presents with    Monitor Current High Risk Meds     Chief Complaint(s) and History of Present Illness(es)       Monitor Current High Risk Meds               Comments    Patient states that his vision is the same since he was here last. No pain and irritation. No flashes of light. No floaters.     Ocular Meds:restasis BID in both eyes    Parag ALLEN, October 1, 2024 7:53 AM

## 2025-01-29 DIAGNOSIS — H16.403 CORNEAL NEOVASCULARIZATION OF BOTH EYES: ICD-10-CM

## 2025-01-29 DIAGNOSIS — H16.253 PHLYCTENULAR KERATOCONJUNCTIVITIS OF BOTH EYES: ICD-10-CM

## 2025-01-29 DIAGNOSIS — H16.9 KERATITIS, BILATERAL: ICD-10-CM

## 2025-01-29 DIAGNOSIS — H40.043 STEROID RESPONDER, BILATERAL: ICD-10-CM

## 2025-01-30 RX ORDER — CYCLOSPORINE 0.5 MG/ML
EMULSION OPHTHALMIC
Qty: 120 EACH | Refills: 9 | Status: SHIPPED | OUTPATIENT
Start: 2025-01-30

## 2025-01-30 NOTE — TELEPHONE ENCOUNTER
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 Inactive.   Restasis 2 times daily both eyes.   Future Visit Date: None/ One year recommended  ----------------------    Refill decision: Medication refilled per  Medication Refill in Ambulatory Care  policy.       Request from pharmacy:  Requested Prescriptions   Pending Prescriptions Disp Refills    RESTASIS 0.05 % ophthalmic emulsion [Pharmacy Med Name: cycloSPORINE (RESTASIS) 0.05 % ophthalmic emulsion]  4     Sig: Place 1 drop into BOTH eyes twice daily.       There is no refill protocol information for this order                      "